# Patient Record
Sex: MALE | Race: WHITE | NOT HISPANIC OR LATINO | Employment: OTHER | URBAN - METROPOLITAN AREA
[De-identification: names, ages, dates, MRNs, and addresses within clinical notes are randomized per-mention and may not be internally consistent; named-entity substitution may affect disease eponyms.]

---

## 2017-09-23 ENCOUNTER — NURSE TRIAGE (OUTPATIENT)
Dept: NURSING | Facility: CLINIC | Age: 32
End: 2017-09-23

## 2017-09-23 NOTE — TELEPHONE ENCOUNTER
"  Reason for Disposition    [1] Caller requesting NON-URGENT health information AND [2] PCP's office is the best resource     \"I am calling because I live in [place], [place], and I see a psychiatrist, on many medications. But lately I don't feel like they are working. I am , 4 kids, but I don't enjoy doing the things I would normally enjoy. I feel depressed, and disassociated from my thoughts and body. I am not suicidal. I was dx as an adult with PTSD, Bipolar, ADHD and anxiety. I also have chronic pain. I feel like I need to see someone soon and I was referred to [facility].\" I advised RD ER,. Also spent a great deal of time on the phone discussing issues and feelings. I also gave numbers for Crisis Center and Behavioral health intake . Call back if needed.    Additional Information    Negative: [1] Caller is not with the adult (patient) AND [2] reporting urgent symptoms    Negative: Lab result questions    Negative: Medication questions    Negative: Caller cannot be reached by phone    Negative: Caller has already spoken to PCP or another triager    Negative: RN needs further essential information from caller in order to complete triage    Negative: Requesting regular office appointment    Protocols used: INFORMATION ONLY CALL-ADULT-    "

## 2017-09-26 ENCOUNTER — HOSPITAL ENCOUNTER (INPATIENT)
Facility: CLINIC | Age: 32
LOS: 2 days | Discharge: HOME | DRG: 885 | End: 2017-09-29
Attending: EMERGENCY MEDICINE | Admitting: PSYCHIATRY & NEUROLOGY
Payer: COMMERCIAL

## 2017-09-26 DIAGNOSIS — F31.64 BIPOLAR DISORDER, CURRENT EPISODE MIXED, SEVERE, WITH PSYCHOTIC FEATURES (H): ICD-10-CM

## 2017-09-26 DIAGNOSIS — F31.81 BIPOLAR 2 DISORDER (H): Primary | ICD-10-CM

## 2017-09-26 LAB
AMPHETAMINES UR QL SCN: NEGATIVE
BARBITURATES UR QL: NEGATIVE
BENZODIAZ UR QL: POSITIVE
CANNABINOIDS UR QL SCN: NEGATIVE
COCAINE UR QL: NEGATIVE
ETHANOL UR QL SCN: NEGATIVE
OPIATES UR QL SCN: NEGATIVE

## 2017-09-26 PROCEDURE — 80307 DRUG TEST PRSMV CHEM ANLYZR: CPT | Performed by: PSYCHIATRY & NEUROLOGY

## 2017-09-26 PROCEDURE — 80320 DRUG SCREEN QUANTALCOHOLS: CPT | Performed by: PSYCHIATRY & NEUROLOGY

## 2017-09-26 PROCEDURE — 99285 EMERGENCY DEPT VISIT HI MDM: CPT | Mod: 25 | Performed by: EMERGENCY MEDICINE

## 2017-09-26 PROCEDURE — 90791 PSYCH DIAGNOSTIC EVALUATION: CPT

## 2017-09-26 PROCEDURE — 99285 EMERGENCY DEPT VISIT HI MDM: CPT | Mod: Z6 | Performed by: EMERGENCY MEDICINE

## 2017-09-26 NOTE — IP AVS SNAPSHOT
MRN:[id number]                      After Visit Summary   9/26/2017    Declan Nation    MRN: [id number]           Thank you!     Thank you for choosing [facility] for your care. Our goal is always to provide you with excellent care.        Patient Information     Date Of Birth          1985        Designated Caregiver       Most Recent Value    Caregiver    Will someone help with your care after discharge? yes    Name of designated caregiver Wife    Phone number of caregiver na    Caregiver address same      About your hospital stay     You were admitted on:  September 27, 2017 You last received care in the:  [facility]    You were discharged on:  September 29, 2017        Reason for your hospital stay       Bipolar 2, obsessions                  Who to Call     For medical emergencies, please call 911.  For non-urgent questions about your medical care, please call your primary care provider or clinic, [phone]          Attending Provider     Provider Specialty    Vivek Cadena MD Emergency Medicine    [facility], Kentrell Beal MD Psychiatry    [facility], Shun Asencio MD Psychiatry       Primary Care Provider Office Phone # Fax #    Aman Staley [phone]      After Care Instructions     Activity       Your activity upon discharge: activity as tolerated            Diet       Follow this diet upon discharge: Orders Placed This Encounter      Regular Diet Adult            Discharge Instructions       1. Please do not harm yourself or others.  2. Please continue to take your medications.  3. Please follow up with your outpatient care team.  4. Please do not take drugs or alcohol as this will worsen your condition.  5. Please do not take more than the prescribed doses of medications as this may make them dangerous.   6. Please follow your safety plan of action.  7. Please call crisis if having trouble.  8. If having thoughts of harming self or others please come in to the  emergency department as soon as possible.                  Further instructions from your care team        Behavioral Discharge Planning and Instructions      Summary:  You were admitted on 9/26/2017  due to PTSD (Post Tramatic Stress Disorder), Bipolar II Disorder  and Suicidal Ideations.  You were treated by Dr. Kentrlel Thayer MD and discharged on 09/29/17 from [facility] to follow up with your outpatient providers.    Principal Diagnosis: Bipolar II Disorder     Health Care Follow-up Appointments:   [facility]   [address]  [place] [address]  Phone: [phone]  Psychiatrist: Dr. Gaetano Kay    [facility]   [address]  [place] [address]   Phone: [phone]   Therapist: Dr. Aman Guevara     If no appointments scheduled, explain: Message were left to schedule. Patient is able to independently schedule his appointments following discharge  Attend all scheduled appointments with your outpatient providers. Call at least 24 hours in advance if you need to reschedule an appointment to ensure continued access to your outpatient providers.   Major Treatments, Procedures and Findings:  You were provided with: a psychiatric assessment, assessed for medical stability, medication evaluation and/or management, group therapy and milieu management    Symptoms to Report: feeling more aggressive, increased confusion, losing more sleep, mood getting worse or thoughts of suicide    Early warning signs can include: increased depression or anxiety sleep disturbances increased thoughts or behaviors of suicide or self-harm  increased unusual thinking, such as paranoia or hearing voices    Safety and Wellness:  Take all medicines as directed.  Make no changes unless your doctor suggests them.      Follow treatment recommendations.  Refrain from alcohol and non-prescribed drugs.  Ask your support system to help you reduce your access to items  "that could harm yourself or others. If there is a concern for safety, call 911.    Resources:   Crisis Intervention: 656.701.3455 or 260-241-5420 (TTY: 197.201.7927).  Call anytime for help.  [place] on [place]ness (www.mn.lay.org): 891.548.2260 or 963-572-0583.  Alcoholics Anonymous (www.alcoholics-anonymous.org): Check your phone book for your local chapter.  Suicide Awareness Voices of Education (SAVE) (www.save.org): 769-228-QAPE (3760)  National Suicide Prevention Line (www.mentalhealthmn.org): 745-126-LVHV (6331)  Mental Health Consumer/Survivor Network of [place] (www.mhcsn.net): 712.680.4828 or 881-071-5601  Mental Health Association of [place] (www.mentalhealth.org): 529.387.9607 or 629-257-1914  Self- Management and Recovery Training., SMART-- Toll free: 248.575.7207  [employer]  Text 4 Life: txt \"LIFE\" to 63165 for immediate support and crisis intervention  Crisis text line: Text \"START\" to 005-643. Free, confidential, 24/7.  Crisis Intervention: 702.201.5684 or 959-708-2445. Call anytime for help.     The treatment team has appreciated the opportunity to work with you.     Please take care and make your recovery a daily recovery.   If you have any questions or concerns our unit number is [phone].  Thank you.        Pending Results     No orders found from 9/24/2017 to 9/27/2017.            Statement of Approval     Ordered          09/29/17 1013  I have reviewed and agree with all the recommendations and orders detailed in this document.  EFFECTIVE NOW     Approved and electronically signed by:  Shun Hernandez MD             Admission Information     Date & Time Provider Department Dept. Phone    9/26/2017 Shun Hernandez MD [facility] [phone]      Your Vitals Were     Blood Pressure Pulse Temperature Respirations Height Weight    119/75 73 97  F (36.1  C) (Oral) 16 1.88 m (6' 2\") 93.1 kg (205 lb 4.8 oz)    Pulse Oximetry BMI (Body Mass Index)                " "97% 26.36 kg/m2          [employer] Information     [employer] lets you send messages to your doctor, view your test results, renew your prescriptions, schedule appointments and more. To sign up, go to [email or web address] . Click on \"Log in\" on the left side of the screen, which will take you to the Welcome page. Then click on \"Sign up Now\" on the right side of the page.     You will be asked to enter the access code listed below, as well as some personal information. Please follow the directions to create your username and password.     Your access code is: [email or web address]  Expires: 2017 12:14 PM     Your access code will  in 90 days. If you need help or a new code, please call your [facility] or [phone].        Care EveryWhere ID     This is your Care EveryWhere ID. This could be used by other organizations to access your [facility] medical records  [id number]        Equal Access to Services     DEQUAN ROMAN : Hadii ana olivero Sogabriel, waaxda luqadaha, qaybta kaalmada adeegyadeirdre, bakari pike . So [facility] [phone].    ATENCIÓN: Si habla español, tiene a rosas disposición servicios gratuitos de asistencia lingüística. Llame al [phone].    We comply with applicable federal civil rights laws and [place] laws. We do not discriminate on the basis of race, color, national origin, age, disability, sex, sexual orientation, or gender identity.               Review of your medicines      START taking        Dose / Directions    lithium 300 MG CR tablet   Commonly known as:  ESKALITH/LITHOBID        Dose:  300 mg   Take 1 tablet (300 mg) by mouth every 12 hours   Quantity:  60 tablet   Refills:  0         CONTINUE these medicines which have NOT CHANGED        Dose / Directions    acetaminophen 500 MG tablet   Commonly known as:  TYLENOL        Dose:  1000 mg   Take 1,000 mg by mouth every 6 hours as needed for pain   Refills:  0       cetirizine 10 MG tablet   Commonly " known as:  zyrTEC        Dose:  10 mg   Take 10 mg by mouth daily   Refills:  0       DIAZEPAM PO   Indication:  Feeling Anxious        Dose:  10 mg   Take 10 mg by mouth 2 times daily as needed for anxiety Patient reported he takes 10 mg in the morning when he wakes up and as needed in the evening   Refills:  0       LATUDA PO        Dose:  80 mg   Take 80 mg by mouth At Bedtime Take at 1030 pm   Refills:  0       methylphenidate 10 MG tablet   Commonly known as:  RITALIN        Dose:  10 mg   Take 10 mg by mouth 2 times daily Patient reports he takes in the morning and at 1730   Refills:  0       nexIUM 20 MG CR capsule   Generic drug:  esomeprazole        Dose:  20 mg   Take 20 mg by mouth daily (with dinner) Take 30-60 minutes before eating.   Refills:  0       propranolol 20 MG tablet   Commonly known as:  INDERAL        Dose:  20 mg   Take 20 mg by mouth 2 times daily as needed (EPS)   Refills:  0       ZYPREXA ZYDIS 5 MG ODT tab   Generic drug:  OLANZapine zydis        Dose:  5-10 mg   Take 5-10 mg by mouth daily as needed (anxiety)   Refills:  0            Where to get your medicines      These medications were sent to [facility] [place], [place] - [address]  [address] [address]     Phone:  [phone]     lithium 300 MG CR tablet                Protect others around you: Learn how to safely use, store and throw away your medicines at www.disposemymeds.org.             Medication List: This is a list of all your medications and when to take them. Check marks below indicate your daily home schedule. Keep this list as a reference.      Medications           Morning Afternoon Evening Bedtime As Needed    acetaminophen 500 MG tablet   Commonly known as:  TYLENOL   Take 1,000 mg by mouth every 6 hours as needed for pain   Last time this was given:  975 mg on 9/28/2017  8:54 PM                                   cetirizine 10 MG tablet   Commonly known as:  zyrTEC    Take 10 mg by mouth daily   Last time this was given:  10 mg on 9/28/2017  8:55 PM                                   DIAZEPAM PO   Take 10 mg by mouth 2 times daily as needed for anxiety Patient reported he takes 10 mg in the morning when he wakes up and as needed in the evening   Last time this was given:  10 mg on 9/29/2017 10:11 AM                                   LATUDA PO   Take 80 mg by mouth At Bedtime Take at 1030 pm   Last time this was given:  80 mg on 9/28/2017  8:54 PM                                   lithium 300 MG CR tablet   Commonly known as:  ESKALITH/LITHOBID   Take 1 tablet (300 mg) by mouth every 12 hours   Last time this was given:  300 mg on 9/29/2017 10:10 AM                                      methylphenidate 10 MG tablet   Commonly known as:  RITALIN   Take 10 mg by mouth 2 times daily Patient reports he takes in the morning and at 1730                                      nexIUM 20 MG CR capsule   Take 20 mg by mouth daily (with dinner) Take 30-60 minutes before eating.   Generic drug:  esomeprazole                                   propranolol 20 MG tablet   Commonly known as:  INDERAL   Take 20 mg by mouth 2 times daily as needed (EPS)   Last time this was given:  20 mg on 9/28/2017  8:54 PM                                   ZYPREXA ZYDIS 5 MG ODT tab   Take 5-10 mg by mouth daily as needed (anxiety)   Last time this was given:  5 mg on 9/28/2017  3:13 PM   Generic drug:  OLANZapine zydis                                             More Information        Patient Education    Lithium Carbonate Oral capsule    Lithium Carbonate Oral tablet    Lithium Carbonate Oral tablet, extended-release    Lithium Citrate Oral solution  Lithium Carbonate Oral tablet  What is this medicine?  LITHIUM (LITH ee um) is used to prevent and treat the manic episodes caused by manic-depressive illness.  This medicine may be used for other purposes; ask your health care provider or pharmacist if you have  questions.  What should I tell my health care provider before I take this medicine?  They need to know if you have any of these conditions:    Brugada Syndrome    dehydration (diarrhea or sweating)    heart or blood vessel disease    kidney disease    low level of salt in the blood, or on a low salt diet    an unusual or allergic reaction to lithium, other medicines, foods, dyes, or preservatives    pregnant or trying to get pregnant    breast-feeding  How should I use this medicine?  Take this medicine by mouth with a glass of water. Follow the directions on the prescription label. Take after a meal or snack to avoid stomach upset. Take your doses at regular intervals. Do not take your medicine more often than directed. The amount of this medicine you take is very important. Taking more than the prescribed dose can cause serious side effects. Do not stop taking except on the advice of your doctor or health care professional.  Talk to your pediatrician regarding the use of this medicine in children. Special care may be needed. While this drug may be prescribed for children as young as 12 years for selected conditions, precautions do apply.  Overdosage: If you think you have taken too much of this medicine contact a poison control center or emergency room at once.  NOTE: This medicine is only for you. Do not share this medicine with others.  What if I miss a dose?  If you miss a dose, take it as soon as you can. If it is almost time for your next dose, take only that dose. Do not take double or extra doses.  What may interact with this medicine?  Do not take this medicine with any of the following medications:    cisapride    dofetilide    dronedarone    pimozide    stimulant medicines used to treat ADHD or narcolepsy    thioridazine    ziprasidone  This medicine may also interact with the following medications:    caffeine    calcium iodide    carbamazepine    certain medicines for depression, anxiety, or psychotic  disturbances    diuretics    medicines for high blood pressure    metronidazole    NSAIDs, medicines for pain and inflammation, like ibuprofen or naproxen    other medicines that prolong the QT interval (cause an abnormal heart rhythm)    phenytoin    potassium iodide, KI    sodium bicarbonate    sodium chloride    urea  This list may not describe all possible interactions. Give your health care provider a list of all the medicines, herbs, non-prescription drugs, or dietary supplements you use. Also tell them if you smoke, drink alcohol, or use illegal drugs. Some items may interact with your medicine.  What should I watch for while using this medicine?  Visit your doctor or health care professional for regular checks on your progress. It can take several weeks of treatment before you start to get better.  The amount of salt (sodium) in your body influences the effects of this medicine, and this medicine can increase salt loss from the body. Eat a normal diet that includes salt. Do not change to salt substitutes. Avoid changes involving diet, or medications that include large amounts of sodium like sodium bicarbonate. Ask your doctor or health care professional for advice if you are not sure.  Drink plenty of fluids while you are taking this medicine. Avoid drinks that contain caffeine, such as coffee, tea and dino. You will need extra fluids if you have diarrhea or sweat a lot. This will help prevent toxic effects from this medicine. Be careful not to get overheated during exercise, saunas, hot baths, and hot weather. Consult your doctor or health care professional if you have a high fever or persistent diarrhea.  You may get drowsy or dizzy. Do not drive, use machinery, or do anything that needs mental alertness until you know how this medicine affects you. Do not stand or sit up quickly, especially if you are an older patient. This reduces the risk of dizzy or fainting spells.  What side effects may I notice  from receiving this medicine?  Side effects that you should report to your doctor or health care professional as soon as possible:    allergic reactions like skin rash, itching or hives, swelling of the face, lips, or tongue    blurred vision    breathing problems    clumsiness or loss of balance    confusion    difficulty speaking or swallowing    dizziness    feeling faint or lightheaded, falls    increased thirst    increased urination    loss of appetite    muscle weakness    nausea, vomiting    pain, coldness, or blue coloration of fingers or toes    sensitivity to cold    seizures    slow, fast, or irregular heartbeat (palpitations)    slurred speech    swelling in the neck    unusually weak or tired  Side effects that usually do not require medical attention (report to your doctor or health care professional if they continue or are bothersome):    acne    diarrhea    mild tremor    stomach pain    weight gain  This list may not describe all possible side effects. Call your doctor for medical advice about side effects. You may report side effects to FDA at 3-791-FDA-1758.  Where should I keep my medicine?  Keep out of the reach of children.  Store at room temperature between 15 and 30 degrees C (59 and 86 degrees F). Throw away any unused medicine after the expiration date.  NOTE:This sheet is a summary. It may not cover all possible information. If you have questions about this medicine, talk to your doctor, pharmacist, or health care provider. Copyright  2016 Gold Standard

## 2017-09-26 NOTE — IP AVS SNAPSHOT
[address] [address]    Phone:  [phone]                                       After Visit Summary   9/26/2017    Declan Nation    MRN: [id number]           After Visit Summary Signature Page     I have received my discharge instructions, and my questions have been answered. I have discussed any challenges I see with this plan with the nurse or doctor.    ..........................................................................................................................................  Patient/Patient Representative Signature      ..........................................................................................................................................  Patient Representative Print Name and Relationship to Patient    ..................................................               ................................................  Date                                            Time    ..........................................................................................................................................  Reviewed by Signature/Title    ...................................................              ..............................................  Date                                                            Time

## 2017-09-27 PROBLEM — F31.81 BIPOLAR 2 DISORDER (H): Status: ACTIVE | Noted: 2017-09-27

## 2017-09-27 PROCEDURE — 25000132 ZZH RX MED GY IP 250 OP 250 PS 637: Performed by: PSYCHIATRY & NEUROLOGY

## 2017-09-27 PROCEDURE — 25000125 ZZHC RX 250: Performed by: PSYCHIATRY & NEUROLOGY

## 2017-09-27 PROCEDURE — 12400007 ZZH R&B MH INTERMEDIATE UMMC

## 2017-09-27 PROCEDURE — 25000132 ZZH RX MED GY IP 250 OP 250 PS 637: Performed by: EMERGENCY MEDICINE

## 2017-09-27 RX ORDER — PANTOPRAZOLE SODIUM 20 MG/1
20 TABLET, DELAYED RELEASE ORAL
Status: DISCONTINUED | OUTPATIENT
Start: 2017-09-27 | End: 2017-09-29 | Stop reason: HOSPADM

## 2017-09-27 RX ORDER — BISACODYL 10 MG
10 SUPPOSITORY, RECTAL RECTAL DAILY PRN
Status: DISCONTINUED | OUTPATIENT
Start: 2017-09-27 | End: 2017-09-29 | Stop reason: HOSPADM

## 2017-09-27 RX ORDER — CLONAZEPAM 0.5 MG/1
0.5 TABLET ORAL 2 TIMES DAILY PRN
Status: DISCONTINUED | OUTPATIENT
Start: 2017-09-27 | End: 2017-09-28

## 2017-09-27 RX ORDER — ACETAMINOPHEN 325 MG/1
975 TABLET ORAL EVERY 4 HOURS PRN
Status: DISCONTINUED | OUTPATIENT
Start: 2017-09-27 | End: 2017-09-29 | Stop reason: HOSPADM

## 2017-09-27 RX ORDER — ACETAMINOPHEN 500 MG
1000 TABLET ORAL EVERY 6 HOURS PRN
COMMUNITY

## 2017-09-27 RX ORDER — ACETAMINOPHEN 325 MG/1
650 TABLET ORAL EVERY 4 HOURS PRN
Status: DISCONTINUED | OUTPATIENT
Start: 2017-09-27 | End: 2017-09-29 | Stop reason: HOSPADM

## 2017-09-27 RX ORDER — ALUMINA, MAGNESIA, AND SIMETHICONE 2400; 2400; 240 MG/30ML; MG/30ML; MG/30ML
30 SUSPENSION ORAL EVERY 4 HOURS PRN
Status: DISCONTINUED | OUTPATIENT
Start: 2017-09-27 | End: 2017-09-29 | Stop reason: HOSPADM

## 2017-09-27 RX ORDER — OLANZAPINE 5 MG/1
5-10 TABLET, ORALLY DISINTEGRATING ORAL DAILY PRN
Status: DISCONTINUED | OUTPATIENT
Start: 2017-09-27 | End: 2017-09-29 | Stop reason: HOSPADM

## 2017-09-27 RX ORDER — PROPRANOLOL HYDROCHLORIDE 20 MG/1
20 TABLET ORAL 2 TIMES DAILY PRN
Status: DISCONTINUED | OUTPATIENT
Start: 2017-09-27 | End: 2017-09-29 | Stop reason: HOSPADM

## 2017-09-27 RX ORDER — CITALOPRAM HYDROBROMIDE 10 MG/1
10 TABLET ORAL DAILY
Status: DISCONTINUED | OUTPATIENT
Start: 2017-09-27 | End: 2017-09-28

## 2017-09-27 RX ORDER — PROPRANOLOL HYDROCHLORIDE 20 MG/1
20 TABLET ORAL 2 TIMES DAILY PRN
COMMUNITY

## 2017-09-27 RX ORDER — LURASIDONE HYDROCHLORIDE 80 MG/1
80 TABLET, FILM COATED ORAL AT BEDTIME
Status: DISCONTINUED | OUTPATIENT
Start: 2017-09-27 | End: 2017-09-29 | Stop reason: HOSPADM

## 2017-09-27 RX ORDER — ONDANSETRON 4 MG/1
4 TABLET, ORALLY DISINTEGRATING ORAL EVERY 6 HOURS PRN
Status: DISCONTINUED | OUTPATIENT
Start: 2017-09-27 | End: 2017-09-29 | Stop reason: HOSPADM

## 2017-09-27 RX ORDER — TRAZODONE HYDROCHLORIDE 50 MG/1
50 TABLET, FILM COATED ORAL
Status: DISCONTINUED | OUTPATIENT
Start: 2017-09-27 | End: 2017-09-29 | Stop reason: HOSPADM

## 2017-09-27 RX ORDER — HYDROXYZINE HYDROCHLORIDE 25 MG/1
25-50 TABLET, FILM COATED ORAL EVERY 4 HOURS PRN
Status: DISCONTINUED | OUTPATIENT
Start: 2017-09-27 | End: 2017-09-29 | Stop reason: HOSPADM

## 2017-09-27 RX ORDER — OLANZAPINE 5 MG/1
5-10 TABLET, ORALLY DISINTEGRATING ORAL DAILY PRN
COMMUNITY

## 2017-09-27 RX ORDER — CETIRIZINE HYDROCHLORIDE 10 MG/1
10 TABLET ORAL DAILY
COMMUNITY

## 2017-09-27 RX ORDER — METHYLPHENIDATE HYDROCHLORIDE 10 MG/1
10 TABLET ORAL 2 TIMES DAILY
COMMUNITY

## 2017-09-27 RX ORDER — CETIRIZINE HYDROCHLORIDE 5 MG/1
10 TABLET ORAL EVERY EVENING
Status: DISCONTINUED | OUTPATIENT
Start: 2017-09-27 | End: 2017-09-29 | Stop reason: HOSPADM

## 2017-09-27 RX ADMIN — ONDANSETRON 4 MG: 4 TABLET, ORALLY DISINTEGRATING ORAL at 22:08

## 2017-09-27 RX ADMIN — ACETAMINOPHEN 975 MG: 325 TABLET, FILM COATED ORAL at 04:16

## 2017-09-27 RX ADMIN — LURASIDONE HYDROCHLORIDE 80 MG: 80 TABLET, FILM COATED ORAL at 20:37

## 2017-09-27 RX ADMIN — CETIRIZINE HYDROCHLORIDE 10 MG: 5 TABLET, FILM COATED ORAL at 20:35

## 2017-09-27 RX ADMIN — ACETAMINOPHEN 975 MG: 325 TABLET, FILM COATED ORAL at 13:19

## 2017-09-27 RX ADMIN — OLANZAPINE 10 MG: 5 TABLET, ORALLY DISINTEGRATING ORAL at 20:35

## 2017-09-27 RX ADMIN — PANTOPRAZOLE SODIUM 20 MG: 20 TABLET, DELAYED RELEASE ORAL at 20:35

## 2017-09-27 RX ADMIN — PROPRANOLOL HYDROCHLORIDE 20 MG: 20 TABLET ORAL at 20:35

## 2017-09-27 ASSESSMENT — ENCOUNTER SYMPTOMS
DIFFICULTY URINATING: 0
EYE REDNESS: 0
CONFUSION: 0
NECK STIFFNESS: 0
FEVER: 0
ARTHRALGIAS: 0
COLOR CHANGE: 0
HEADACHES: 0
DECREASED CONCENTRATION: 1
ABDOMINAL PAIN: 0
NERVOUS/ANXIOUS: 1
DYSPHORIC MOOD: 1
SHORTNESS OF BREATH: 0

## 2017-09-27 ASSESSMENT — ACTIVITIES OF DAILY LIVING (ADL)
DRESS: INDEPENDENT
ORAL_HYGIENE: INDEPENDENT
GROOMING: INDEPENDENT
LAUNDRY: UNABLE TO COMPLETE

## 2017-09-27 NOTE — PHARMACY-ADMISSION MEDICATION HISTORY
Admission medication history interview status for the 9/26/2017 admission is complete. See Epic admission navigator for allergy information, pharmacy, prior to admission medications and immunization status.     Medication history interview sources:  Patient, Prescription bottles from [facility]    Changes made to PTA medication list (reason)  Added: propranolol, Zyrtec  Deleted: Celexa, clonazepam, DHEA supplement, omeprazole, propafenone  Changed: Acetaminophen (clarified directions), Nexium (clarified dose), methylphenidate (clarified directions), Zyprexa (changed to ODT)    Additional medication history information (including reliability of information, actions taken by pharmacist): The patient was a reliable historian and able to discuss medications without difficulty. This writer viewed his prescription medications then returned them to his belongings.    Diazepam 10 mg last filled on 09/07/17 for quantity 60  Methylphenidate last filled on 09/11/17 for quantity 60      Prior to Admission medications    Medication Sig Last Dose Taking? Auth Provider   DIAZEPAM PO Take 10 mg by mouth 2 times daily as needed for anxiety Patient reported he takes 10 mg in the morning when he wakes up and as needed in the evening 9/27/2017 at AM Yes Reported, Patient   Lurasidone HCl (LATUDA PO) Take 80 mg by mouth At Bedtime Take at 1030 pm 9/26/2017 at PM Yes Reported, Patient   cetirizine (ZYRTEC) 10 MG tablet Take 10 mg by mouth daily 9/25/2017 at Unknown time Yes Reported, Patient   acetaminophen (TYLENOL) 500 MG tablet Take 1,000 mg by mouth every 6 hours as needed for pain  Yes Unknown, Entered By History   esomeprazole (NEXIUM) 20 MG CR capsule Take 20 mg by mouth daily (with dinner) Take 30-60 minutes before eating. 9/26/2017 at Unknown time Yes Unknown, Entered By History   methylphenidate (RITALIN) 10 MG tablet Take 10 mg by mouth 2 times daily Patient reports he takes in the morning and at  1730 9/27/2017 at AM Yes Unknown, Entered By History   OLANZapine zydis (ZYPREXA ZYDIS) 5 MG ODT tab Take 5-10 mg by mouth daily as needed (anxiety)  Yes Unknown, Entered By History   propranolol (INDERAL) 20 MG tablet Take 20 mg by mouth 2 times daily as needed (EPS)  Yes Unknown, Entered By History         Medication history completed by: Maryse Calhoun, PharmD, BCPP

## 2017-09-27 NOTE — ED PROVIDER NOTES
History     Chief Complaint   Patient presents with     Psychiatric Evaluation     HPI  Declan Nation is a 32 year old male who was referred to the [facility] emergency department to be seen by [facility].  The patient was was referred by his counselor and psychiatrist.  The patient reports he has a history of bipolar disorder.  He's had increasing restlessness and uneasiness.  The patient underwent an increase in his Latuda dose 4-6 weeks ago without significant improvement.  Over the past 2 weeks, he states he is becoming increasingly uncomfortable with his thoughts.  Patient denies suicide ideations but did state he had thoughts about cutting himself in the arm 4-5 days ago.  He states that he did not act on this and does not know why he would have such a thought.  The patient states that he also had thoughts several days ago that his 12-year-old neighbor who babysits his kids was going to stab his kids and then slit her own throat.  The patient denies concern that he is going to harm himself or others.  He denies hallucinations.  He denies recent illness.     I have reviewed the Medications, Allergies, Past Medical and Surgical History, and Social History in the Epic system.    Review of Systems   Constitutional: Negative for fever.   HENT: Negative for congestion.    Eyes: Negative for redness.   Respiratory: Negative for shortness of breath.    Cardiovascular: Negative for chest pain.   Gastrointestinal: Negative for abdominal pain.   Genitourinary: Negative for difficulty urinating.   Musculoskeletal: Negative for arthralgias and neck stiffness.   Skin: Negative for color change.   Neurological: Negative for headaches.   Psychiatric/Behavioral: Positive for decreased concentration and dysphoric mood. Negative for confusion. The patient is nervous/anxious.    All other systems reviewed and are negative.      Physical Exam   BP: 125/83  Pulse: 66  Temp: 98.5  F (36.9  C)  Resp: 16  Weight: 94.8 kg (209 lb)  SpO2:  99 %  Physical Exam   Constitutional: He appears well-developed and well-nourished. No distress.   HENT:   Head: Normocephalic and atraumatic.   Eyes: Pupils are equal, round, and reactive to light. No scleral icterus.   Neck: Normal range of motion.   Cardiovascular: Normal rate, regular rhythm, normal heart sounds and intact distal pulses.    Pulmonary/Chest: Effort normal and breath sounds normal. No respiratory distress.   Abdominal: Soft. Bowel sounds are normal. There is no tenderness.   Musculoskeletal: Normal range of motion. He exhibits no edema or tenderness.   Neurological: He is alert. He has normal strength. Coordination normal.   Skin: Skin is warm and dry. No rash noted. He is not diaphoretic.   Psychiatric: His mood appears anxious. He expresses suicidal ideation.   Nursing note and vitals reviewed.      ED Course     ED Course     Procedures            Critical Care time:    Results for orders placed or performed during the hospital encounter of 09/26/17 (from the past 24 hour(s))   Drug abuse screen 6 urine (tox)   Result Value Ref Range    Amphetamine Qual Urine Negative NEG^Negative    Barbiturates Qual Urine Negative NEG^Negative    Benzodiazepine Qual Urine Positive (A) NEG^Negative    Cannabinoids Qual Urine Negative NEG^Negative    Cocaine Qual Urine Negative NEG^Negative    Ethanol Qual Urine Negative NEG^Negative    Opiates Qualitative Urine Negative NEG^Negative      Seen by [facility] .            Assessments & Plan (with Medical Decision Making)   32 year old male with history of bipolar disorder referred to the emergency department by his psychiatrist and therapist.  The patient has had increase in the intrusive thoughts including those of self-harm and those that his 12-year-old neighbor stabs his daughters and then splits her own throat.  The patient does not appear safe for outpatient management.  I am concerned for his safety as well as the safety of his family and neighbor  girl.    I have reviewed the nursing notes.    I have reviewed the findings, diagnosis, plan and need for follow up with the patient.    New Prescriptions    No medications on file       Final diagnoses:   Bipolar disorder, current episode mixed, severe, with psychotic features (H)       9/26/2017   [facility], [place], EMERGENCY DEPARTMENT     Vivek Cadena MD  09/27/17 0042

## 2017-09-27 NOTE — ED NOTES
Patient referred to the [facility] emergency department to be seen by [facility].  The patient was was referred by his counselor and psychiatrist.  The patient reports he has a history of bipolar disorder.  He's had increasing restlessness and uneasiness.  The patient underwent an increase in his Latuda dose 4-6 weeks ago without significant improvement.  Over the past 2 weeks, he states he is becoming increasingly uncomfortable with his thoughts.  Patient denies suicide ideations but did state he had thoughts about cutting himself in the arm 4-5 days ago.  He states that he did not act on this and does not know why he would have such a thought.  The patient states that he also had thoughts several days ago that his 12-year-old neighbor who babysits his kids was going to stab his kids and then slit her own throat.  The patient denies concern that he is going to harm himself or others.  He denies hallucinations.  He denies recent illness.  The patient has normal vital signs and a normal physical exam.  He appears medically stable for [facility] assessment     Vivek Cadena MD  09/26/17 2043

## 2017-09-27 NOTE — PROGRESS NOTES
09/27/17 9835   Patient Belongings   Did you bring any home meds/supplements to the hospital?  Yes   Disposition of meds  Sent to security/pharmacy per site process   Belongings Search Yes   Clothing Search Yes   Second Staff Abel        Pt.'s belongings in locker: 1 cell phone, 1 cell phone , 1 wallet, 1 belt, 1 sweatshirt.  Brought in for patient 9-28-17 and placed in locker: 6 pair of underwear, 4 pair of socks, 6 white teresa shirts, three colored teresa shirts (grey, blue, red), Packers sweatshirt, deodorant, clog slippers      Sent to Security: 2 State IDs, 1 [employer] credit car, 1 [employer] credit card, 1 Paypal credit card, 1 [employer] debit card, 1 Master card/Google Wallet, 1 ring.                    Admission:  I am responsible for any personal items that are not sent to the safe or pharmacy.  [facility] is not responsible for loss, theft or damage of any property in my possession.    Signature:  _________________________________ Date: _______  Time: _____                                              Staff Signature:  ____________________________ Date: ________  Time: _____      2nd Staff person, if patient is unable/unwilling to sign:    Signature: ________________________________ Date: ________  Time: _____     Discharge:  [facility] has returned all of my personal belongings:    Signature: _________________________________ Date: ________  Time: _____                                          Staff Signature:  ____________________________ Date: ________  Time: _____

## 2017-09-28 LAB
ALBUMIN SERPL-MCNC: 4.1 G/DL (ref 3.4–5)
ALP SERPL-CCNC: 84 U/L (ref 40–150)
ALT SERPL W P-5'-P-CCNC: 16 U/L (ref 0–70)
ANION GAP SERPL CALCULATED.3IONS-SCNC: 11 MMOL/L (ref 3–14)
AST SERPL W P-5'-P-CCNC: <3 U/L (ref 0–45)
BASOPHILS # BLD AUTO: 0 10E9/L (ref 0–0.2)
BASOPHILS NFR BLD AUTO: 0.4 %
BILIRUB SERPL-MCNC: 1 MG/DL (ref 0.2–1.3)
BUN SERPL-MCNC: 16 MG/DL (ref 7–30)
CALCIUM SERPL-MCNC: 9 MG/DL (ref 8.5–10.1)
CHLORIDE SERPL-SCNC: 107 MMOL/L (ref 94–109)
CHOLEST SERPL-MCNC: 220 MG/DL
CO2 SERPL-SCNC: 25 MMOL/L (ref 20–32)
CREAT SERPL-MCNC: 1.13 MG/DL (ref 0.66–1.25)
DEPRECATED CALCIDIOL+CALCIFEROL SERPL-MC: 17 UG/L (ref 20–75)
DIFFERENTIAL METHOD BLD: NORMAL
EOSINOPHIL # BLD AUTO: 0.1 10E9/L (ref 0–0.7)
EOSINOPHIL NFR BLD AUTO: 1.9 %
ERYTHROCYTE [DISTWIDTH] IN BLOOD BY AUTOMATED COUNT: 12.4 % (ref 10–15)
GFR SERPL CREATININE-BSD FRML MDRD: 75 ML/MIN/1.7M2
GLUCOSE SERPL-MCNC: 96 MG/DL (ref 70–99)
HCT VFR BLD AUTO: 45.3 % (ref 40–53)
HDLC SERPL-MCNC: 39 MG/DL
HGB BLD-MCNC: 15.4 G/DL (ref 13.3–17.7)
IMM GRANULOCYTES # BLD: 0 10E9/L (ref 0–0.4)
IMM GRANULOCYTES NFR BLD: 0.2 %
LDLC SERPL CALC-MCNC: 146 MG/DL
LYMPHOCYTES # BLD AUTO: 2 10E9/L (ref 0.8–5.3)
LYMPHOCYTES NFR BLD AUTO: 37.9 %
MCH RBC QN AUTO: 29.4 PG (ref 26.5–33)
MCHC RBC AUTO-ENTMCNC: 34 G/DL (ref 31.5–36.5)
MCV RBC AUTO: 87 FL (ref 78–100)
MONOCYTES # BLD AUTO: 0.3 10E9/L (ref 0–1.3)
MONOCYTES NFR BLD AUTO: 6 %
NEUTROPHILS # BLD AUTO: 2.9 10E9/L (ref 1.6–8.3)
NEUTROPHILS NFR BLD AUTO: 53.6 %
NONHDLC SERPL-MCNC: 181 MG/DL
NRBC # BLD AUTO: 0 10*3/UL
NRBC BLD AUTO-RTO: 0 /100
PLATELET # BLD AUTO: 209 10E9/L (ref 150–450)
POTASSIUM SERPL-SCNC: 4 MMOL/L (ref 3.4–5.3)
PROT SERPL-MCNC: 7.2 G/DL (ref 6.8–8.8)
RBC # BLD AUTO: 5.24 10E12/L (ref 4.4–5.9)
SODIUM SERPL-SCNC: 143 MMOL/L (ref 133–144)
TRIGL SERPL-MCNC: 175 MG/DL
TSH SERPL DL<=0.005 MIU/L-ACNC: 1.38 MU/L (ref 0.4–4)
WBC # BLD AUTO: 5.4 10E9/L (ref 4–11)

## 2017-09-28 PROCEDURE — 12400007 ZZH R&B MH INTERMEDIATE UMMC

## 2017-09-28 PROCEDURE — 82306 VITAMIN D 25 HYDROXY: CPT | Performed by: PSYCHIATRY & NEUROLOGY

## 2017-09-28 PROCEDURE — 99222 1ST HOSP IP/OBS MODERATE 55: CPT | Mod: AI | Performed by: PSYCHIATRY & NEUROLOGY

## 2017-09-28 PROCEDURE — 80061 LIPID PANEL: CPT | Performed by: PSYCHIATRY & NEUROLOGY

## 2017-09-28 PROCEDURE — 90686 IIV4 VACC NO PRSV 0.5 ML IM: CPT | Performed by: PSYCHIATRY & NEUROLOGY

## 2017-09-28 PROCEDURE — 25000132 ZZH RX MED GY IP 250 OP 250 PS 637: Performed by: EMERGENCY MEDICINE

## 2017-09-28 PROCEDURE — 85025 COMPLETE CBC W/AUTO DIFF WBC: CPT | Performed by: PSYCHIATRY & NEUROLOGY

## 2017-09-28 PROCEDURE — 25000132 ZZH RX MED GY IP 250 OP 250 PS 637: Performed by: PSYCHIATRY & NEUROLOGY

## 2017-09-28 PROCEDURE — 97150 GROUP THERAPEUTIC PROCEDURES: CPT | Mod: GO

## 2017-09-28 PROCEDURE — 36415 COLL VENOUS BLD VENIPUNCTURE: CPT | Performed by: PSYCHIATRY & NEUROLOGY

## 2017-09-28 PROCEDURE — 84443 ASSAY THYROID STIM HORMONE: CPT | Performed by: PSYCHIATRY & NEUROLOGY

## 2017-09-28 PROCEDURE — 80053 COMPREHEN METABOLIC PANEL: CPT | Performed by: PSYCHIATRY & NEUROLOGY

## 2017-09-28 PROCEDURE — 25000128 H RX IP 250 OP 636: Performed by: PSYCHIATRY & NEUROLOGY

## 2017-09-28 PROCEDURE — 90853 GROUP PSYCHOTHERAPY: CPT

## 2017-09-28 RX ORDER — DIAZEPAM 5 MG
10 TABLET ORAL 2 TIMES DAILY PRN
Status: DISCONTINUED | OUTPATIENT
Start: 2017-09-28 | End: 2017-09-29 | Stop reason: HOSPADM

## 2017-09-28 RX ORDER — LITHIUM CARBONATE 300 MG/1
300 TABLET, FILM COATED, EXTENDED RELEASE ORAL EVERY 12 HOURS SCHEDULED
Status: DISCONTINUED | OUTPATIENT
Start: 2017-09-28 | End: 2017-09-29 | Stop reason: HOSPADM

## 2017-09-28 RX ADMIN — PROPRANOLOL HYDROCHLORIDE 20 MG: 20 TABLET ORAL at 20:54

## 2017-09-28 RX ADMIN — OLANZAPINE 5 MG: 5 TABLET, ORALLY DISINTEGRATING ORAL at 15:13

## 2017-09-28 RX ADMIN — LURASIDONE HYDROCHLORIDE 80 MG: 80 TABLET, FILM COATED ORAL at 20:54

## 2017-09-28 RX ADMIN — LITHIUM CARBONATE 300 MG: 300 TABLET, EXTENDED RELEASE ORAL at 09:29

## 2017-09-28 RX ADMIN — DIAZEPAM 10 MG: 5 TABLET ORAL at 09:38

## 2017-09-28 RX ADMIN — OMEPRAZOLE 20 MG: 20 CAPSULE, DELAYED RELEASE ORAL at 18:18

## 2017-09-28 RX ADMIN — ACETAMINOPHEN 975 MG: 325 TABLET, FILM COATED ORAL at 20:54

## 2017-09-28 RX ADMIN — PANTOPRAZOLE SODIUM 20 MG: 20 TABLET, DELAYED RELEASE ORAL at 18:18

## 2017-09-28 RX ADMIN — LITHIUM CARBONATE 300 MG: 300 TABLET, EXTENDED RELEASE ORAL at 20:54

## 2017-09-28 RX ADMIN — INFLUENZA A VIRUS A/MICHIGAN/45/2015 X-275 (H1N1) ANTIGEN (FORMALDEHYDE INACTIVATED), INFLUENZA A VIRUS A/HONG KONG/4801/2014 X-263B (H3N2) ANTIGEN (FORMALDEHYDE INACTIVATED), INFLUENZA B VIRUS B/PHUKET/3073/2013 ANTIGEN (FORMALDEHYDE INACTIVATED), AND INFLUENZA B VIRUS B/BRISBANE/60/2008 ANTIGEN (FORMALDEHYDE INACTIVATED) 0.5 ML: 15; 15; 15; 15 INJECTION, SUSPENSION INTRAMUSCULAR at 10:22

## 2017-09-28 RX ADMIN — CETIRIZINE HYDROCHLORIDE 10 MG: 5 TABLET, FILM COATED ORAL at 20:55

## 2017-09-28 ASSESSMENT — ACTIVITIES OF DAILY LIVING (ADL)
DRESS: INDEPENDENT;SCRUBS (BEHAVIORAL HEALTH)
ORAL_HYGIENE: INDEPENDENT
GROOMING: INDEPENDENT
LAUNDRY: UNABLE TO COMPLETE
ORAL_HYGIENE: INDEPENDENT
DRESS: INDEPENDENT
LAUNDRY: WITH SUPERVISION
HYGIENE/GROOMING: INDEPENDENT

## 2017-09-28 NOTE — PLAN OF CARE
Problem: Patient Care Overview  Goal: Plan of Care/Patient Progress Review  Personal Plan of Care     Reasons you are in the Hospital  1. Symptoms of Bipolar and PTSD have worsened   2. Weird thoughts, unsure where they come from   3.  4.     Goals for Discharge   1. Get well   2. Figure out medication changes/what needs to happen   3. Need more help

## 2017-09-28 NOTE — PROGRESS NOTES
Initial Psychosocial Assessment    I have reviewed the chart, met with the patient, and developed Care Plan.  Information for assessment was obtained from:     Chart review and interview with Pt.     Presenting Problem:  Writer met with Pt who was cooperative during assessment interview. Writer discussed day treatment and or DBT groups with Pt who stated he was not interested in having a higher level of care on an outpatient basis. Pt indicated he is more concerned about medications getting sorted out than discussing adult mental health groups. Writer expressed understanding of this. Pt appears apprehensive and indecisive, he reported he would like to be discharged, and in the same sentence talked about medication adjustments/needing time for changes. Pt demonstrated having difficulty maintaining eye contact with Writer. Pt signed ROIs for outpatient treatment providers listed below.     Per chart:   Declan Nation is a 32 year old male admitted to station 10 due to not being stable on his current medication regimen. He is diagnosed with Bipolar type2, PTSD, INOCENTE, and ADHD. In the last week he has had intrusive thoughts about suicide and thought that his 12 year old neighbor who watches his daughters was going to stab them and then slit her own throat and kill herself as well. At one point the patient was sitting on the couch, playing on his phone and he suddenly had thoughts of going into the kitchen and cutting up his harms. He realized that these were inappropriate thoughts and did not act them, but would like to not have them at all. He is  and has 4 daughters, 6, 4, 2 and almost 1 year old. They live in a home and are both unemployed. His parents have been successful business owners and financially support Declan and his family. He is calm and cooperative. He states he doesn't have any SI or SIB thoughts. He says he has SI thoughts that never have a plan, but that he sometimes wonders if someone were to get  "shot he would be okay with it. He has been started on latuda and feels that it can keep him somewhat stable, but not as stable and he would like to be. Patient says he has tried just about every anti-seizure med out there and it has not worked. He has not tried lithium and would like to keep it for a last resort. Patient reports that his bipolar thoughts come faster than most and he becomes very manic even while he is in a \"low phase\" and that it creates an \"energetic, horrible depression.\" He reports that he is in chronic pain and that he has had surgery on just about every major joint in his body including his shoulders, wrists, and legs. No behavioral concerns at this time. Patient was calm and cooperative throughout the entire admission. He did appear very anxious and became tearful at points.    History of Mental Health and Chemical Dependency:  This is Pt's first mental health hospitalization. Pt does have a psychiatrist and therapist, has never done any day treatment/partial hospitalization program. Pt has never attempted suicide.   Pt denied any drug or alcohol use.     Family Description (Constellation, Family Psychiatric History):  Pt was raised in [place] raised by both parents. Pt has one younger brother. Pt is , they have four daughters together.   Pt reported his paternal uncle had schizophrenia.     Significant Life Events (Illness, Abuse, Trauma, Death):  Pt stated, \"nothing that needs to be addressed here.\"      Living Situation:  Pt lives in home with family.     Educational Background:  Pt graduated high school, attended some college.     Occupational History:  Unemployed, previously self-employed      Financial Status:  Assistance from family members, in the beginning stages of applying for social security disability     Legal Issues:  None     Ethnic/Cultural Issues:  None     Spiritual Orientation:  Rastafari      Service History:  None     Social Functioning (organization, " interests):  Interests: computers, video games, sports, typical trent stuff, family, sci-fiction/fantasy nerd   Supports: wife and children (in a way), mother and father, in-laws     Current Treatment Providers are:  [facility]   [address]  [place] [address]  Phone: [phone]  Psychiatrist: Dr. Gaetano Kay    [facility]   [address]  [place] [address]   Phone: [phone]   Therapist: Dr. Aamn Guevara     Social Service Assessment/Plan:  Patient has been admitted for psychiatric stabilization due to thoughts of self harm. Patient will have psychiatric assessment and medication management by the psychiatrist. Medications will be reviewed and adjusted per MD as indicated. The treatment team will continue to assess and stabilize the patient's mental health symptoms with the use of medications and therapeutic programming. Hospital staff will provide a safe environment and a therapeutic milieu. Staff will continue to assess patient as needed. Patient will participate in unit groups and activities. Patient will receive individual and group support on the unit.  CTC will do individual inpatient treatment planning and after care planning. CTC will discuss options for increasing community supports with the patient. CTC will coordinate with outpatient providers and will place referrals to ensure appropriate follow up care is in place.

## 2017-09-28 NOTE — PLAN OF CARE
Problem: Patient Care Overview  Goal: Team Discussion  Team Plan:   BEHAVIORAL TEAM DISCUSSION     Participants: Manohar Simons OT-R, Yesenia Varela RN, and Dr. Kentrell Thayer.   Progress: Initial team discussion.  Continued Stay Criteria/Rationale: Pt was voluntarily admitted due to thoughts to self harm.   Medical/Physical: See medical consult.   Precautions:   Behavioral Orders   Procedures     Code 1 - Restrict to Unit     Routine Programming       As clinically indicated     Status 15       Every 15 minutes.     Plan: The plan is to assess the patient for mental health and medication needs.  The patient will be prescribed medications to treat the identified symptoms.  Upon discharge the patient will be referred to services as appropriate based on the assessment.  Rationale for change in precautions or plan: Initial plan, no changes.

## 2017-09-28 NOTE — H&P
"DATE OF ADMISSION:  09/26/2017.       PATIENT ARRIVED ON UNIT:  09/27/2017.       PATIENT SEEN ON THE UNIT:  09/28/2017.       CHIEF COMPLAINT:  \"I haven't found the best answer to that.\"      HISTORY OF PRESENT ILLNESS:  Declan Nation is a 32-year-old white male with a history of bipolar disorder and anxiety disorder who was admitted after having self-harm thoughts and thoughts that his 12-year-old neighbor who babysits his daughters was planning to kill them.  He says that his symptoms have not been kept in check in terms of his bipolar disorder recently.  He says he has not actually had suicidal thoughts, but he has had thoughts of self-harm that have been stronger than they have in the past.  Denies homicidal ideation, denies auditory or visual hallucinations.  Discussed the patient's history, has never really had a full manic episode but has had a lot of hypomanic symptoms.  The patient had an analogy of how a student who is getting C-'s or D's is stable but not doing well and said that that is how he has been.  They discussed this with a psychiatrist.  They increased his Latuda 1-1/2 months ago.  Feels a little better, but then overall not that much better.  Does take his Valium in the morning and sometimes later in the day.  Says he has been taking the Ritalin 10 mg twice a day but is not sure if it was helpful.  He says he has been on a lot of different anticonvulsant medications, has never tried lithium, but he is open to this idea.  Denies problems with sleep or appetite.  Mood has been low.      PAST PSYCHIATRIC HISTORY:  The patient does have a psychiatrist and therapist that he sees twice weekly, has never done any structured therapy or day program, never been hospitalized, never attempted suicide.      CURRENT PSYCHIATRIC MEDICATIONS:   1.  Latuda 80 mg q. day.   2.  Valium 10 mg b.i.d. p.r.n.   3.  Ritalin 10 mg b.i.d.   4.  Inderal 20 mg b.i.d. p.r.n.   5.  Zyprexa Zydis 5-10 mg q. day p.r.n.    "   ALLERGIES:  Cortisone.      PAST MEDICAL HISTORY:  GERD and patient says a lot of physical pain issues.      REVIEW OF SYSTEMS:  A 10-point systems reviewed and all were negative except those mentioned in the HPI.      FAMILY PSYCHIATRIC HISTORY:  The patient believes that his paternal uncle had schizophrenia.      SUBSTANCE HISTORY:  The patient denies any alcohol or drug use, is a nonsmoker.      SOCIAL HISTORY:  The patient is , has 4 daughters, currently unemployed.  Parents are emotionally and financially supportive.      PHYSICAL EXAMINATION:  Please see the physical exam note from Dr. Cadena in the ER on 09/27/2017 which I have reviewed.   CURRENT VITAL SIGNS:  Blood pressure 137/86, pulse 64, respirations 16, temperature 97.4.      MENTAL STATUS EXAMINATION:  SEB:  The patient is a 32-year-old white male, cooperative, pleasant, good eye contact.  Speech:  Regular rate, rhythm, volume and tone.  Mood is depressed.  Affect is mood congruent.  Thought process is fairly goal-directed.  Thought content:  Has had recent self-injurious thoughts but no suicidal ideation, denies homicidal ideation, denies any psychosis.  No loosening of associations noted.  Sensorium is clear.  Cognition:  Oriented x3.  Recent and remote memory appear to be grossly intact.  Attention and concentration:  No deficits noted.  Language:  No deficits noted.  Fund of knowledge appropriate.  Muscle strength and tone, no deficits noted.  Gait and station, no deficits noted.  Insight is partial.  Judgment is fair.      DIAGNOSES:   Axis I:     1.  Bipolar disorder type 2.    2.  Post-traumatic stress disorder versus generalized anxiety disorder.   3.  History of attention deficit hyperactivity disorder.   Axis II:  Cluster B traits.   Axis III:  See past medical history.      PLAN:   1.  The patient was voluntarily admitted to station 10.  He was encouraged to engage in groups and with staff on unit.   2.  Did clean up some of the  patient's medications.  He was started on Celexa and says he does not normally take this.  Was also on Klonopin p.r.n. instead of Valium so these were changed.   3.  Will hold the Ritalin at this time and the patient can discuss this further with Dr. Hernandez when he returns tomorrow.   4.  Did discuss lithium at length with the patient.  Did offer him more time to consider it.  He said that he and his psychiatrist have kind of kept this as a last ditch option.  He was open to trying it so we provided some more information on this and will start lithium 300 mg b.i.d.  Risks, benefits and alternatives to this medication were discussed with the patient.   5.  CTC to meet with the patient to discuss a structured therapy such as day programming versus CBT or DBT.   6.  The patient will be followed by Dr. Hernandez starting tomorrow and he voiced understanding of this.         JOSE ARGUETA MD             D: 2017 10:15   T: 2017 11:08   MT: KARY      Name:     TAMIKA MARC   MRN:      [id number]        Account:      [id number]   :      1985           Admitted:     [id number]      Document: [id number]

## 2017-09-28 NOTE — PLAN OF CARE
"Problem: Overarching Goals (Adult)  Goal: Adheres to Safety Considerations for Self and Others  Outcome: No Change     Declan Nation is a 32 year old male admitted to station 10 due to not being stable on his current medication regimen. He is diagnosed with Bipolar type2, PTSD, INOCENTE, and ADHD. In the last week he has had intrusive thoughts about suicide and thought that his 12 year old neighbor who watches his daughters was going to stab them and then slit her own throat and kill herself as well. At one point the patient was sitting on the couch, playing on his phone and he suddenly had thoughts of going into the kitchen and cutting up his harms. He realized that these were inappropriate thoughts and did not act them, but would like to not have them at all. He is  and has 4 daughters, 6, 4, 2 and almost 1 year old. They live in a home and are both unemployed. His parents have been successful business owners and financially support Declan and his family. He is calm and cooperative. He states he doesn't have any SI or SIB thoughts. He says he has SI thoughts that never have a plan, but that he sometimes wonders if someone were to get shot he would be okay with it. He has been started on latuda and feels that it can keep him somewhat stable, but not as stable and he would like to be. Patient says he has tried just about every anti-seizure med out there and it has not worked. He has not tried lithium and would like to keep it for a last resort. Patient reports that his bipolar thoughts come faster than most and he becomes very manic even while he is in a \"low phase\" and that it creates an \"energetic, horrible depression.\" He reports that he is in chronic pain and that he has had surgery on just about every major joint in his body including his shoulders, wrists, and legs. No behavioral concerns at this time. Patient was calm and cooperative throughout the entire admission. He did appear very anxious and became " tearful at points.

## 2017-09-28 NOTE — PROGRESS NOTES
Patient is very calm and social with others. Pt is attending groups and participating in groups. Pt still feels depressed and he has been sleeping for most of the shift. Pt has no thoughts of suicidal ideation. Pt's daily goal is to get better and so he can get out of here faster. Pt has no thoughts of self inuring himself.  Np SI or SIB.

## 2017-09-29 VITALS
BODY MASS INDEX: 26.35 KG/M2 | WEIGHT: 205.3 LBS | SYSTOLIC BLOOD PRESSURE: 119 MMHG | RESPIRATION RATE: 16 BRPM | DIASTOLIC BLOOD PRESSURE: 75 MMHG | TEMPERATURE: 97 F | HEART RATE: 73 BPM | HEIGHT: 74 IN | OXYGEN SATURATION: 97 %

## 2017-09-29 PROCEDURE — 25000132 ZZH RX MED GY IP 250 OP 250 PS 637: Performed by: PSYCHIATRY & NEUROLOGY

## 2017-09-29 PROCEDURE — 99239 HOSP IP/OBS DSCHRG MGMT >30: CPT | Performed by: PSYCHIATRY & NEUROLOGY

## 2017-09-29 PROCEDURE — 25000132 ZZH RX MED GY IP 250 OP 250 PS 637: Performed by: EMERGENCY MEDICINE

## 2017-09-29 RX ORDER — LITHIUM CARBONATE 300 MG/1
300 TABLET, FILM COATED, EXTENDED RELEASE ORAL EVERY 12 HOURS
Qty: 60 TABLET | Refills: 0 | Status: SHIPPED | OUTPATIENT
Start: 2017-09-29

## 2017-09-29 RX ADMIN — TRAZODONE HYDROCHLORIDE 50 MG: 50 TABLET ORAL at 00:01

## 2017-09-29 RX ADMIN — HYDROXYZINE HYDROCHLORIDE 50 MG: 25 TABLET ORAL at 00:01

## 2017-09-29 RX ADMIN — DIAZEPAM 10 MG: 5 TABLET ORAL at 10:11

## 2017-09-29 RX ADMIN — ACETAMINOPHEN 975 MG: 325 TABLET, FILM COATED ORAL at 12:31

## 2017-09-29 RX ADMIN — LITHIUM CARBONATE 300 MG: 300 TABLET, EXTENDED RELEASE ORAL at 10:10

## 2017-09-29 ASSESSMENT — ACTIVITIES OF DAILY LIVING (ADL)
ORAL_HYGIENE: INDEPENDENT
LAUNDRY: WITH SUPERVISION
DRESS: INDEPENDENT
GROOMING: INDEPENDENT

## 2017-09-29 NOTE — PLAN OF CARE
Problem: Mood Impairment (Depressive Signs/Symptoms) (Adult)  Intervention: Promote Mood Improvement  Pt given copy of discharge instructions and medication administration instructions. All discharge plans  were discussed with patient. Pt reports no further questions at this time regarding discharge plans or medications. Pt denies any suicidal ideation, plans or intent at this time. All belongings returned to patient. Plan is to continue with adjusted medications and follow up with outpatient psychiatrist and counselor. Patient discharged with father.

## 2017-09-29 NOTE — PROGRESS NOTES
"SPIRITUAL HEALTH SERVICES  SPIRITUAL ASSESSMENT Progress Note  [facility] ([facility]) 10N     PRIMARY FOCUS:     Emotional/spiritual/Scientology distress    Support for coping    REFERRAL SOURCE: Request for hospital .    ILLNESS CIRCUMSTANCES:   Reviewed documentation. Reflective conversation shared with pt Declan which integrated elements of illness and family narratives.     Context of Serious Illness/Symptom(s) - Management of bipolar, trying a new medication, four small children at home who contribute to stress.    Resources for Support - Wife, parents, psychiatrist, counselor.    DISTRESS:     Emotional/Spiritual/Existential Distress - Thoughts of self-harm    Baptism Distress - None stated    Social/Cultural/Economic Distress - Stress of four small children at home, pt is physically limited and has had 15 orthopedic surgeries which limits his ability to do physical things at home.    SPIRITUAL/Shinto COPING:     Islam/Ana Paula - \"I am Sikh, I love me some Collin.\"    Spiritual Practice(s) - Reads devotionals, stated Sikh ana paula    Emotional/Relational/Existential Connections - Stated connection with Collin, \"I believe God created science,\" belief that God can work through science.    GOALS OF CARE:    Goals of Care - Pt and I spoke about his stress of having four children at home.  I gave permission to name that part of parenting is that children can be stressful.  Pt stated \"I feel guilty saying that family can be stressful sometimes.\"  I affirmed that while families can be a source of support, that all relationships can also experience times of stress.  Pt stated, \"I found out I was going home today, I'm nervous about going home.  When I'm at home with my kids I'm not totally sure what my triggers are.\"  I encouraged pt to continue exploring this area with his counselor.  I spoke with pt to help him name the resources he has for his self-care and especially mental health care outside the " "hospital.  I provided encouragement and prayer for the pt at his request.  Pt seemed to be visibly less anxious and more relaxed at the end of the visit.    Meaning/Sense-Making - Pt stated \"I believe God created science\" and affirmed with pt that God can work through science.  Pt stated one of the doctors here told him a story about a [place] in [facility] [facility] where Lithium (pt stated this is the drug he is going on) is in the drinking water, and in this village, there is no known suicide or self-harm.  This story was powerful for the pt and seemed to give him hope.  A good deal of sense and hope seemed to come from the idea that God can indeed work through doctors, counselors, nurses, and other individuals in the medical/behavorial health fields.      PLAN: No further plan at this time, pt and staff stated he is going to be discharged today.    Joslyn Aguilar  Chaplain Resident  Pager [phone]  "

## 2017-09-29 NOTE — DISCHARGE INSTRUCTIONS
Behavioral Discharge Planning and Instructions      Summary:  You were admitted on 9/26/2017  due to PTSD (Post Tramatic Stress Disorder), Bipolar II Disorder  and Suicidal Ideations.  You were treated by Dr. Kentrell Thayer MD and discharged on 09/29/17 from [facility] to Home to follow up with your outpatient providers.    Principal Diagnosis: Bipolar II Disorder     Health Care Follow-up Appointments:   [facility]   [address]  [place] [address]  Phone: [phone]  Psychiatrist: Dr. Gaetano Kay    [facility]   [address]  [place] [address]   Phone: [phone]   Therapist: Dr. Aman Guevara     If no appointments scheduled, explain: Message were left to schedule. Patient is able to independently schedule his appointments following discharge  Attend all scheduled appointments with your outpatient providers. Call at least 24 hours in advance if you need to reschedule an appointment to ensure continued access to your outpatient providers.   Major Treatments, Procedures and Findings:  You were provided with: a psychiatric assessment, assessed for medical stability, medication evaluation and/or management, group therapy and milieu management    Symptoms to Report: feeling more aggressive, increased confusion, losing more sleep, mood getting worse or thoughts of suicide    Early warning signs can include: increased depression or anxiety sleep disturbances increased thoughts or behaviors of suicide or self-harm  increased unusual thinking, such as paranoia or hearing voices    Safety and Wellness:  Take all medicines as directed.  Make no changes unless your doctor suggests them.      Follow treatment recommendations.  Refrain from alcohol and non-prescribed drugs.  Ask your support system to help you reduce your access to items that could harm yourself or others. If there is a concern for safety, call 911.    Resources:   Crisis  "Intervention: 820.927.8094 or 269-338-3844 (TTY: 696.644.8002).  Call anytime for help.  National [place] on Mental Illness (www.mn.lay.org): 741.590.4612 or 139-225-6462.  Alcoholics Anonymous (www.alcoholics-anonymous.org): Check your phone book for your local chapter.  Suicide Awareness Voices of Education (SAVE) (www.save.org): 164-218-IIJP (4962)  National Suicide Prevention Line (www.mentalhealthmn.org): 601-616-LYPG (5641)  Mental Health Consumer/Survivor Network of [place] (www.mhcsn.net): 122.683.6903 or 209-783-5382  Mental Health Association [place] (www.mentalhealth.org): 768.946.5672 or 660-520-7790  Self- Management and Recovery Training., [employer]-- Toll free: 793.109.9195  www."[employer],".gDecide  Text 4 Life: txt \"LIFE\" to 16289 for immediate support and crisis intervention  Crisis text line: Text \"START\" to 154-882. Free, confidential, 24/7.  Crisis Intervention: 206.353.2514 or 969-329-2995. Call anytime for help.     The treatment team has appreciated the opportunity to work with you.     Please take care and make your recovery a daily recovery.   If you have any questions or concerns our unit number is [phone].  Thank you.      "

## 2017-09-29 NOTE — DISCHARGE SUMMARY
[facility], [place]   Psychiatric Discharge Summary  Time: 42 minutes on encounter.    Declan Nation MRN# [id number]   Age: 32 year old YOB: 1985     Date of Admission:  9/26/2017  Date of Discharge:  09/29/17  Admitting Physician:  Shun Brown  Discharge Physician:  Shun Brown         Event Leading to Hospitalization and Hospital Course:   Declan Nation was admitted for concerning thoughts of self-harm and intrusive thoughts about next door neighbor harming his children.        See Admission note by Flaca on 9/28 for additional details.     Declan Nation was hospitalized for obsessional thoughts related to harming himself and a fault about the neighbor possibly harming his children. He felt this was concerning and told his wife and presents to the hospital. The thoughts quickly resolved prior to even coming into the inpatient psychiatric unit. He was started on lithium primarily related to his mood being slightly low in comparison to where she would like to be but understands that with bipolar disorder he does not want to become hypomanic. He was placed on 300 mg of lithium twice per day by my colleague that did the admission.    He has not had any difficulty on the unit has been attending groups he is not had any trouble tolerating the medication we discussed the side effects that are potential with his current medicines. We had a lengthy discussion about his future medication management would include maximizing the lurasidone and possibly maximizing the lithium. If this is not effective with then start discontinuing medications that are not effective. Would consider going back to antidepressant medications and having some type of mood stabilizing agent at the same time such as an antipsychotic that may be he has not tried at this point. This would be a slow process of titration and discontinuation of medication. He does seem as though he does  need something for anxiety coverage to prevent as well as as needed. We discussed in detail the alejandro totter of bipolar illness and that we would maybe have to give antidepressant medication when he is low and take away when he becomes hypomanic. He was accepting of all this information and discharge he was not having any thoughts of hurting himself or others or any paranoia or hallucinations or any obsessional thinking. He is hopeful and is looking forward to his future and he has been sleeping well generally. He does have some continued symptoms of depression which include anhedonia.           DIagnoses:   Bipolar 2 disorder most recent episode depressed  Posttraumatic stress disorder history  Generalized anxiety disorder  History of ADHD  Possible personality disorder characteristics         Labs:   No results found for this or any previous visit (from the past 24 hour(s)).         Consults:   No consultations were requested during this admission           Discharge Medications:        Review of your medicines      START taking       Dose / Directions    lithium 300 MG CR tablet   Commonly known as:  ESKALITH/LITHOBID        Dose:  300 mg   Take 1 tablet (300 mg) by mouth every 12 hours   Quantity:  60 tablet   Refills:  0         CONTINUE these medicines which have NOT CHANGED       Dose / Directions    acetaminophen 500 MG tablet   Commonly known as:  TYLENOL        Dose:  1000 mg   Take 1,000 mg by mouth every 6 hours as needed for pain   Refills:  0       cetirizine 10 MG tablet   Commonly known as:  zyrTEC        Dose:  10 mg   Take 10 mg by mouth daily   Refills:  0       DIAZEPAM PO   Indication:  Feeling Anxious        Dose:  10 mg   Take 10 mg by mouth 2 times daily as needed for anxiety Patient reported he takes 10 mg in the morning when he wakes up and as needed in the evening   Refills:  0       LATUDA PO        Dose:  80 mg   Take 80 mg by mouth At Bedtime Take at 1030 pm   Refills:  0        methylphenidate 10 MG tablet   Commonly known as:  RITALIN        Dose:  10 mg   Take 10 mg by mouth 2 times daily Patient reports he takes in the morning and at 1730   Refills:  0       nexIUM 20 MG CR capsule   Generic drug:  esomeprazole        Dose:  20 mg   Take 20 mg by mouth daily (with dinner) Take 30-60 minutes before eating.   Refills:  0       propranolol 20 MG tablet   Commonly known as:  INDERAL        Dose:  20 mg   Take 20 mg by mouth 2 times daily as needed (EPS)   Refills:  0       ZYPREXA ZYDIS 5 MG ODT tab   Generic drug:  OLANZapine zydis        Dose:  5-10 mg   Take 5-10 mg by mouth daily as needed (anxiety)   Refills:  0            Where to get your medicines      These medications were sent to [facility] [place], [place] - [address]  [address] [facility] [address] [address]     Phone:  [phone]      lithium 300 MG CR tablet                  Mental Status Examination:   Declan is a 32-year-old male wearing a green sweater. His speech Appropriate Rate and Tone and His Language Is Intact. His Behavior Is Appropriate and His Gait Is Intact. His affect is neutral and he smiles at times. His mood he describes as pretty well. His thought content consists of the above issues without thoughts of harming himself or others or current delusions. His thought process is logical without looseness of association. He does not have any abnormal perceptions. He is alert aware of his current location and treatment. His long-term/short-term/remote memory appears intact. His cognition and fund of knowledge appears a normal caliber. His insight and judgment are both good.         Discharge Plan:     Reason for your hospital stay   Bipolar 2, obsessions     Activity   Your activity upon discharge: activity as tolerated     Discharge Instructions   1. Please do not harm yourself or others.  2. Please continue to take your medications.  3. Please follow up with your outpatient care  team.  4. Please do not take drugs or alcohol as this will worsen your condition.  5. Please do not take more than the prescribed doses of medications as this may make them dangerous.   6. Please follow your safety plan of action.  7. Please call crisis if having trouble.  8. If having thoughts of harming self or others please come in to the emergency department as soon as possible.     Full Code     Diet   Follow this diet upon discharge: Orders Placed This Encounter     Regular Diet Adult             Further instructions from your care team        Behavioral Discharge Planning and Instructions      Summary:  You were admitted on 9/26/2017  due to PTSD (Post Tramatic Stress Disorder), Bipolar II Disorder  and Suicidal Ideations.  You were treated by Dr. Kentrell Thayer MD and discharged on 09/29/17 from [facility] to Home.     Principal Diagnosis: Bipolar II Disorder     Health Care Follow-up Appointments:   [facility] [address]  [place] [address]  Phone: [phone]  Psychiatrist: Dr. Gaetano Kay    [facility] [place]   [address] [place] [address] [address]  [place] [address]   Phone: [phone]   Therapist: Dr. Aman Guevara       Attend all scheduled appointments with your outpatient providers. Call at least 24 hours in advance if you need to reschedule an appointment to ensure continued access to your outpatient providers.   Major Treatments, Procedures and Findings:  You were provided with: a psychiatric assessment, assessed for medical stability, medication evaluation and/or management, group therapy and milieu management    Symptoms to Report: feeling more aggressive, increased confusion, losing more sleep, mood getting worse or thoughts of suicide    Early warning signs can include: increased depression or anxiety sleep disturbances increased thoughts or behaviors of suicide or self-harm  increased unusual thinking, such as paranoia or hearing  "voices    Safety and Wellness:  Take all medicines as directed.  Make no changes unless your doctor suggests them.      Follow treatment recommendations.  Refrain from alcohol and non-prescribed drugs.  Ask your support system to help you reduce your access to items that could harm yourself or others. If there is a concern for safety, call 911.    Resources:   Crisis Intervention: 617.810.2867 or 337-986-0500 (TTY: 577.509.8689).  Call anytime for help.  National [place] on Mental Illness (www.mn.lay.org): 432.926.7424 or 126-110-1353.  Alcoholics Anonymous (www.alcoholics-anonymous.org): Check your phone book for your local chapter.  Suicide Awareness Voices of Education (SAVE) (www.save.org): 805-533-ZMRX (9393)  National Suicide Prevention Line (www.mentalhealthmn.org): 872-529-SPGD (8066)  Mental Health Consumer/Survivor Network of [place] (www.mhcsn.net): 970.439.4743 or 727-377-2848  Mental Health Association of [place] (www.mentalhealth.org): 582.699.8373 or 872-844-3777  Self- Management and Recovery Training., [employer]-- Toll free: 832.707.5898  www.[employer].Zeer  Text 4 Life: txt \"LIFE\" to 49269 for immediate support and crisis intervention  Crisis text line: Text \"START\" to 451-873. Free, confidential, 24/7.  Crisis Intervention: 433.185.5690 or 912-353-2141. Call anytime for help.     The treatment team has appreciated the opportunity to work with you.     Please take care and make your recovery a daily recovery.   If you have any questions or concerns our unit number is [phone].  Thank you.              Please send copy to Dr. Gaetano Kay    During hospitalizations patients have perpetuating, complicating, situational, acute, and chronic conditions that lead to risk for suicidality or homicidality. During hospitalizations we mitigate any modifiable risk factors that may have been identified in the history and physical examination and throughout the hospitalization. Chronic non-modifiable risk factors " are not able to be changed with acute hospitalization. As a patient that is discharging these risk factors have been modified as much as possible and a supportive network and safety plan has been put in place. Further modifications and assistance will have to be obtained in the outpatient setting and the inpatient hospitalization team is no longer responsible for outcomes.    Shun Brown  [facility]      The following document has been created with voice recognition software and may contain unintentional word substitutions.

## 2017-09-29 NOTE — PROGRESS NOTES
Pt was visible in the milieu most of shift, and somewhat social with peers.  Pt main concern is getting discharged after adequate medication adjustment.  Pt denied any suicidal thinking, denied depression, but endorsed low to moderate levels of anxiety.     09/28/17 2210   Behavioral Health   Hallucinations denies / not responding to hallucinations   Thinking intact   Orientation person: oriented;place: oriented;date: oriented;time: oriented   Memory baseline memory   Insight admits / accepts   Eye Contact at examiner   Affect blunted, flat   Mood depressed   Physical Appearance/Attire attire appropriate to age and situation   Hygiene well groomed   Suicidality other (see comments)  (pt denies)   Self Injury other (see comment)  (pt denies)   Elopement (None stated or observed)   Activity other (see comment)  (visible and social in the milieu)   Speech clear;coherent   Medication Sensitivity no stated side effects;no observed side effects   Psychomotor / Gait balanced;steady   Coping/Psychosocial   Verbalized Emotional State acceptance;anxiety;depression   Psycho Education   Type of Intervention 1:1 intervention   Response participates, initiates socially appropriate   Hours (10)   Treatment Detail mh check in   Activities of Daily Living   Hygiene/Grooming independent   Oral Hygiene independent   Dress independent   Laundry with supervision   Room Organization independent

## 2017-09-29 NOTE — PROGRESS NOTES
Attended 2 of 3 OT groups offered. Actively participated in discussions and activities surrounding strengths and self nurturing/care. Conversation focused on his chronic pain/ physical limitations. Noted he mostly is in bed all day playing video/phone games to distract himself. Had a difficult time identifying strengths, however, with prompting/cuing he was able to identify strengths and acknowledge how focusing on them could be beneficial in his daily living. Again, in a group focused on self nurturing/care, he was focused on his physical limitations limiting his ability to identify or explore healthy self care/ self management activities or techniques.Pt was given and completed a written self assessment. OT purpose was explained with a value of having involvement in tx plan, and provided options to meet self identified goals. Will assess further in the areas of organization, problem solving, and concentration.

## 2020-09-01 ENCOUNTER — AMBULATORY - HEALTHEAST (OUTPATIENT)
Dept: SURGERY | Facility: CLINIC | Age: 35
End: 2020-09-01

## 2020-09-01 DIAGNOSIS — Z11.59 ENCOUNTER FOR SCREENING FOR OTHER VIRAL DISEASES: ICD-10-CM

## 2020-09-16 ENCOUNTER — OFFICE VISIT - RIVER FALLS (OUTPATIENT)
Dept: FAMILY MEDICINE | Facility: CLINIC | Age: 35
End: 2020-09-16

## 2020-09-16 ASSESSMENT — MIFFLIN-ST. JEOR: SCORE: 2003.55

## 2020-11-02 ENCOUNTER — AMBULATORY - HEALTHEAST (OUTPATIENT)
Dept: LAB | Facility: CLINIC | Age: 35
End: 2020-11-02

## 2020-11-02 DIAGNOSIS — Z11.59 ENCOUNTER FOR SCREENING FOR OTHER VIRAL DISEASES: ICD-10-CM

## 2020-11-03 ASSESSMENT — MIFFLIN-ST. JEOR: SCORE: 1985.86

## 2020-11-04 ENCOUNTER — COMMUNICATION - HEALTHEAST (OUTPATIENT)
Dept: SCHEDULING | Facility: CLINIC | Age: 35
End: 2020-11-04

## 2020-11-04 ENCOUNTER — ANESTHESIA - HEALTHEAST (OUTPATIENT)
Dept: SURGERY | Facility: CLINIC | Age: 35
End: 2020-11-04

## 2020-11-05 ENCOUNTER — SURGERY - HEALTHEAST (OUTPATIENT)
Dept: SURGERY | Facility: CLINIC | Age: 35
End: 2020-11-05

## 2020-11-05 ASSESSMENT — MIFFLIN-ST. JEOR: SCORE: 1985.86

## 2021-06-03 ENCOUNTER — AMBULATORY - HEALTHEAST (OUTPATIENT)
Dept: SURGERY | Facility: CLINIC | Age: 36
End: 2021-06-03

## 2021-06-03 DIAGNOSIS — Z11.59 ENCOUNTER FOR SCREENING FOR OTHER VIRAL DISEASES: ICD-10-CM

## 2021-06-07 ENCOUNTER — RECORDS - HEALTHEAST (OUTPATIENT)
Dept: ADMINISTRATIVE | Facility: OTHER | Age: 36
End: 2021-06-07

## 2021-06-12 NOTE — ANESTHESIA POSTPROCEDURE EVALUATION
Patient: Declan Nation  Procedure(s):  GASTROCNEMIUS RECESSION (Right)  CALCANEAL OSTEOTOMY, JOSAFAT OSTEOTOMY (Right)  TARSAL TUNNEL DECOMPRESSION, RIGHT ANKLE (Right)  Anesthesia type: general    Patient location: PACU  Last vitals:   Vitals Value Taken Time   /58 11/05/20 1132   Temp 36.3  C (97.3  F) 11/05/20 1050   Pulse 73 11/05/20 1134   Resp 16 11/05/20 1130   SpO2 99 % 11/05/20 1134   Vitals shown include unvalidated device data.  Post vital signs: stable  Level of consciousness: awake and responds to simple questions  Post-anesthesia pain: pain controlled  Post-anesthesia nausea and vomiting: no  Pulmonary: unassisted, return to baseline  Cardiovascular: stable and blood pressure at baseline  Hydration: adequate  Anesthetic events: no    QCDR Measures:  ASA# 11 - Lexi-op Cardiac Arrest: ASA11B - Patient did NOT experience unanticipated cardiac arrest  ASA# 12 - Lexi-op Mortality Rate: ASA12B - Patient did NOT die  ASA# 13 - PACU Re-Intubation Rate: ASA13B - Patient did NOT require a new airway mgmt  ASA# 10 - Composite Anes Safety: ASA10A - No serious adverse event    Additional Notes:

## 2021-06-12 NOTE — ANESTHESIA CARE TRANSFER NOTE
Last vitals:   Vitals:    11/05/20 1013   BP: 109/52   Pulse: 72   Resp: 12   Temp: 36.4  C (97.5  F)   SpO2: 100%     Patient's level of consciousness is awake  Spontaneous respirations: yes  Maintains airway independently: yes  Dentition unchanged: yes  Oropharynx: oropharynx clear of all foreign objects    QCDR Measures:  ASA# 20 - Surgical Safety Checklist: WHO surgical safety checklist completed prior to induction    PQRS# 430 - Adult PONV Prevention: 4558F - Pt received => 2 anti-emetic agents (different classes) preop & intraop  ASA# 8 - Peds PONV Prevention: NA - Not pediatric patient, not GA or 2 or more risk factors NOT present  PQRS# 424 - Lexi-op Temp Management: 4559F - At least one body temp DOCUMENTED => 35.5C or 95.9F within required timeframe  PQRS# 426 - PACU Transfer Protocol: - Transfer of care checklist used  ASA# 14 - Acute Post-op Pain: ASA14B - Patient did NOT experience pain >= 7 out of 10

## 2021-06-12 NOTE — ANESTHESIA PROCEDURE NOTES
Peripheral Block    Patient location during procedure: pre-op  Start time: 11/5/2020 7:05 AM  End time: 11/5/2020 7:08 AM  post-op analgesia per surgeon order as noted in medical record  Staffing:  Performing  Anesthesiologist: Nina Jaime MD  Preanesthetic Checklist  Completed: patient identified, site marked, risks, benefits, and alternatives discussed, timeout performed, consent obtained, at patient's request, airway assessed, oxygen available, suction available, emergency drugs available and hand hygiene performed  Peripheral Block  Block type: sciatic, popliteal  Prep: ChloraPrep  Patient position: supine (right leg elevated)  Patient monitoring: cardiac monitor, continuous pulse oximetry, heart rate and blood pressure  Laterality: right  Injection technique: ultrasound guided    Ultrasound used to visualize needle placement in proximity to nerve being blocked: yes   US used to visualize anesthetic spread  Visualized anatomic structures normal  No Pathological Findings  Permanent ultrasound image captured for medical record  Sterile gel and probe cover used for ultrasound.

## 2021-06-12 NOTE — ANESTHESIA PREPROCEDURE EVALUATION
Anesthesia Evaluation      Patient summary reviewed   No history of anesthetic complications     Airway   Mallampati: II  Neck ROM: full  Comment: 2 finger breath mouth opening, limited TMJ mobility   Pulmonary     breath sounds clear to auscultation                         Cardiovascular   Rhythm: regular  Rate: normal,         Neuro/Psych    (+) depression, chronic pain    Comments: Bipolar disorder    Endo/Other    (-) no obesity     GI/Hepatic/Renal       Other findings:   COVID negative 11/2      Dental      Comment: Good dentition, no loose or removable teeth                       Anesthesia Plan  Planned anesthetic: peripheral nerve block, total IV anesthesia and general LMA  Single shot popliteal nerve block r/b/a discussed and patient in agreement to proceed   Acetaminophen, Mg 4 g (pre-op), Precedex gtt, ketamine on induction  Dexamethasone 10 mg, ondansetron 4 mg  ASA 2   Induction: intravenous   Anesthetic plan and risks discussed with: patient  Anesthesia plan special considerations: antiemetics,   Post-op plan: routine recovery

## 2021-06-15 ENCOUNTER — RECORDS - HEALTHEAST (OUTPATIENT)
Dept: ADMINISTRATIVE | Facility: OTHER | Age: 36
End: 2021-06-15

## 2021-06-15 ENCOUNTER — AMBULATORY - HEALTHEAST (OUTPATIENT)
Dept: LAB | Facility: CLINIC | Age: 36
End: 2021-06-15

## 2021-06-15 DIAGNOSIS — Z11.59 ENCOUNTER FOR SCREENING FOR OTHER VIRAL DISEASES: ICD-10-CM

## 2021-06-16 LAB
SARS-COV-2 PCR COMMENT: NORMAL
SARS-COV-2 RNA SPEC QL NAA+PROBE: NEGATIVE
SARS-COV-2 VIRUS SPECIMEN SOURCE: NORMAL

## 2021-06-17 ENCOUNTER — COMMUNICATION - HEALTHEAST (OUTPATIENT)
Dept: SCHEDULING | Facility: CLINIC | Age: 36
End: 2021-06-17

## 2021-06-17 ENCOUNTER — ANESTHESIA - HEALTHEAST (OUTPATIENT)
Dept: SURGERY | Facility: CLINIC | Age: 36
End: 2021-06-17

## 2021-06-17 ENCOUNTER — HOSPITAL ENCOUNTER (OUTPATIENT)
Dept: SURGERY | Facility: CLINIC | Age: 36
Discharge: HOME | End: 2021-06-17
Attending: PODIATRIST | Admitting: PODIATRIST
Payer: COMMERCIAL

## 2021-06-17 ENCOUNTER — RECORDS - HEALTHEAST (OUTPATIENT)
Dept: ADMINISTRATIVE | Facility: OTHER | Age: 36
End: 2021-06-17

## 2021-06-17 ENCOUNTER — SURGERY - HEALTHEAST (OUTPATIENT)
Dept: SURGERY | Facility: CLINIC | Age: 36
End: 2021-06-17
Payer: COMMERCIAL

## 2021-06-17 DIAGNOSIS — Z98.890 S/P FOOT SURGERY: ICD-10-CM

## 2021-06-26 NOTE — ANESTHESIA CARE TRANSFER NOTE
Last vitals:   Vitals:    06/17/21 1227   BP: (P) 158/84   Pulse: (P) 94   Resp: (P) 10   Temp: (P) 36.3  C (97.3  F)   SpO2: (P) 100%     Patient's level of consciousness is awake and drowsy  Spontaneous respirations: yes  Maintains airway independently: yes  Dentition unchanged: yes  Oropharynx: oral airway in place    QCDR Measures:  ASA# 20 - Surgical Safety Checklist: WHO surgical safety checklist completed prior to induction    PQRS# 430 - Adult PONV Prevention: 4558F-8P - Pt did NOT receive => 2 anti-emetic agents  ASA# 8 - Peds PONV Prevention: NA - Not pediatric patient, not GA or 2 or more risk factors NOT present  PQRS# 424 - Lexi-op Temp Management: 4559F - At least one body temp DOCUMENTED => 35.5C or 95.9F within required timeframe  PQRS# 426 - PACU Transfer Protocol: - Transfer of care checklist used  ASA# 14 - Acute Post-op Pain: ASA14B - Patient did NOT experience pain >= 7 out of 10

## 2021-06-26 NOTE — ANESTHESIA PREPROCEDURE EVALUATION
Anesthesia Evaluation      Patient summary reviewed   No history of anesthetic complications     Airway   Mallampati: I  Neck ROM: full   Pulmonary - negative ROS and normal exam                          Cardiovascular - negative ROS and normal exam   Neuro/Psych - negative ROS   (+) depression, anxiety/panic attacks, chronic pain    Endo/Other - negative ROS      GI/Hepatic/Renal - negative ROS           Dental - normal exam                        Anesthesia Plan  Planned anesthetic: general LMA and peripheral nerve block  Peripheral nerve block for post-op analgesia as ordered by surgeon.  Popliteal nerve block.  ASA 2   Induction: intravenous   Anesthetic plan and risks discussed with: patient    Post-op plan: routine recovery

## 2021-06-26 NOTE — BRIEF OP NOTE
Brief Operative Note    Name:  Declan Nation  Location: [facility] Main OR  Procedure Date:  6/17/2021  PCP:  Jason Field MD      LEFT ANKLE GASTROC RECESSION, (Left), CALCANEAL SLIDE OSTEOTOMY, PLANTAR FLEXION MEDIAL CUNEIFORM OSTEOTOMY, (Left), ANKLE ARTHROSCOPY, (Left), BROSTROM PROCEDURE WITH INTERNAL BRACE (Left)    Pre-Procedure Diagnosis:    Equinus contracture of left ankle [M24.572]  Left ankle instability [M25.372]  Impingement of left ankle joint [M25.872]  Acquired planovalgus deformity of left foot [M21.6X2]     Post-Procedure Diagnosis:    Same as pre-operative diagnosis      Surgeon(s):  Roge Buchanan DPM    Findings: None    Estimated Blood Loss: 7 mL from 6/17/2021  9:36 AM to 6/17/2021 12:25 PM      Specimens:    * No specimens in log *       Drains: none       Implants:  [unfilled]    Complications:    None    Roge Buchanan     Date: 6/17/2021  Time: 12:38 PM

## 2021-06-26 NOTE — ANESTHESIA PROCEDURE NOTES
Peripheral Block    Patient location during procedure: pre-op  Start time: 6/17/2021 8:39 AM  End time: 6/17/2021 8:44 AM  post-op analgesia per surgeon order as noted in medical record  Staffing:  Performing  Anesthesiologist: Shun Dickson MD  Preanesthetic Checklist  Completed: patient identified, site marked, risks, benefits, and alternatives discussed, timeout performed, consent obtained, airway assessed, oxygen available, suction available, emergency drugs available and hand hygiene performed  Peripheral Block  Block type: sciatic, popliteal  Prep: ChloraPrep  Patient position: supine  Patient monitoring: blood pressure, heart rate, continuous pulse oximetry and cardiac monitor  Laterality: left  Injection technique: ultrasound guided    Ultrasound used to visualize needle placement in proximity to nerve being blocked: yes   US used to visualize anesthetic spread  Visualized anatomic structures normal  No Pathological Findings  Permanent ultrasound image captured for medical record  Sterile gel and probe cover used for ultrasound.  Needle  Needle type: Stimuplex   Needle gauge: 22 G  Needle length: 6 in  no peripheral nerve catheter placed  Assessment  Injection assessment: negative aspiration for heme, no paresthesia on injection, incremental injection and no difficulty with injection  Additional Notes  30ml 0.5% bupivacaine w/ epi 1:200K injected for nerve block.

## 2021-06-26 NOTE — ANESTHESIA POSTPROCEDURE EVALUATION
Patient: Declan Nation  Procedure(s):  LEFT ANKLE GASTROC RECESSION, (Left)  CALCANEAL SLIDE OSTEOTOMY, PLANTAR FLEXION MEDIAL CUNEIFORM OSTEOTOMY, (Left)  ANKLE ARTHROSCOPY, (Left)  BROSTROM PROCEDURE WITH INTERNAL BRACE (Left)  Anesthesia type: general    Patient location: PACU  Last vitals:   Vitals Value Taken Time   /82 06/17/21 1320   Temp 36.9  C (98.5  F) 06/17/21 1320   Pulse 93 06/17/21 1322   Resp 39 06/17/21 1322   SpO2 99 % 06/17/21 1322   Vitals shown include unvalidated device data.  Post vital signs: stable  Level of consciousness: awake and responds to simple questions  Post-anesthesia pain: pain controlled  Post-anesthesia nausea and vomiting: no  Pulmonary: unassisted, return to baseline  Cardiovascular: stable and blood pressure at baseline  Hydration: adequate  Anesthetic events: no    QCDR Measures:  ASA# 11 - Lexi-op Cardiac Arrest: ASA11B - Patient did NOT experience unanticipated cardiac arrest  ASA# 12 - Lexi-op Mortality Rate: ASA12B - Patient did NOT die  ASA# 13 - PACU Re-Intubation Rate: ASA13B - Patient did NOT require a new airway mgmt  ASA# 10 - Composite Anes Safety: ASA10A - No serious adverse event    Additional Notes:

## 2021-06-26 NOTE — PROGRESS NOTES
Pharmacist completed medication history with the patient while in the KEY.  Prior to admission (PTA) med list completed and updated in the electronic medical record (EMR).  Pharmacy Note - Admission Medication History  Pertinent Provider Information: none   ______________________________________________________________________  Prior To Admission (PTA) med list completed and updated in EMR.     PTA Med List   Medication Sig Last Dose     acetaminophen (TYLENOL) 500 MG tablet Take 1,000 mg by mouth every 6 (six) hours as needed for pain. 6/15/2021     busPIRone (BUSPAR) 7.5 MG tablet Take 7.5 mg by mouth 2 (two) times a day. 6/17/2021 at Unknown time     clomiPHENE (CLOMID) 50 mg tablet Take 25 mg by mouth daily.  6/17/2021 at Unknown time     fluticasone propionate (FLONASE) 50 mcg/actuation nasal spray Apply 2 sprays into each nostril 2 (two) times a day as needed.  6/16/2021 at Unknown time     ibuprofen (ADVIL,MOTRIN) 200 MG tablet Take 800 mg by mouth every 6 (six) hours as needed for pain. 6/3/2021     ISOtretinoin (ACCUTANE) 40 MG capsule Take 40 mg by mouth 2 (two) times a day.  6/17/2021 at Unknown time     loratadine (CLARITIN) 10 mg tablet Take 10 mg by mouth daily.  6/17/2021 at Unknown time     naloxone (NARCAN) 4 mg/actuation nasal spray by Intranasal route as needed for opioid reversal. 1 spray (4 mg dose) into one nostril for opioid reversal. Call 911. May repeat if no response in 3 minutes. never used     polyethylene glycol (MIRALAX) 17 gram packet Take 17 g by mouth daily as needed. 6/15/2021     tapentadoL (NUCYNTA ER) 50 mg Tb12 Take 50 mg by mouth 2 (two) times a day. 6/16/2021 at Unknown time     tapentadoL (NUCYNTA) 50 mg tablet Take  mg by mouth every 6 (six) hours as needed.  6/16/2021 at Unknown time     triamcinolone (KENALOG) 0.025 % cream Apply topically 3 (three) times a day as needed. 6/3/2021       Information source(s): Patient and CareEverywhere/SureScripts  Patient was asked  about OTC/herbal products specifically.  PTA med list reflects this.  Based on the pharmacist s assessment, the PTA med list information appears reliable  Allergies were reviewed, assessed, and updated with the patient.    Medications available for use during hospital stay: NONE  Thank you for the opportunity to participate in the care of this patient.    The patient was asked about OTC/herbal products specifically and the PTA med list reflects the patient's response.    Allergies were reviewed and assessed with the patient, responses were updated in the EMR.    Thank you for the opportunity to participate in the care of this patient.    Chavo Stephenson [facility]     6/17/2021     8:42 AM

## 2021-07-03 NOTE — ADDENDUM NOTE
Addendum Note by Nina Jaime MD at 11/5/2020 11:37 AM     Author: Nina Jaime MD Service: -- Author Type: Physician    Filed: 11/5/2020 11:37 AM Date of Service: 11/5/2020 11:37 AM Status: Signed    : Nina Jaime MD (Physician)       Addendum  created 11/05/20 1137 by Nina Jaime MD    Child order released for a procedure order, Clinical Note Signed, Intraprocedure Blocks edited, Intraprocedure Meds edited

## 2021-07-06 VITALS — BODY MASS INDEX: 24.24 KG/M2 | WEIGHT: 193.9 LBS | BODY MASS INDEX: 24.24 KG/M2 | WEIGHT: 193.9 LBS

## 2021-07-22 NOTE — OP NOTE
Procedure Date: 06/17/2021    SURGEON:  Roge Buchanan DPM    PREOPERATIVE DIAGNOSES:    1.  Planovalgus foot deformity, left.  2.  Ankle equinus, left.  3.  Anterior ankle impingement, left.  4.  Ankle chronic ankle lateral ankle instability, left.    POSTOPERATIVE DIAGNOSES:  1.  Planovalgus foot deformity, left.  2.  Ankle equinus, left.  3.  Anterior ankle impingement, left.  4.  Ankle chronic ankle lateral ankle instability, left.    PROCEDURES:    1.  Ankle arthroscopy with synovectomy using a 3.5 mm Arthrex shaver.  2.  Medializing calcaneal osteotomy with two 5.0 Arthrex headless screws.   3.  Gastroc recession.   4.  Plantar flex rewedge osteotomy, medial cuneiform, left foot using an Arthrex wedge spacer.  5.  Brostrom lateral ankle stabilization procedure, left.    ANESTHESIA:  General with popliteal nerve block.    PREOPERATIVE ANTIBIOTIC: Ancef 2 grams IV.    ESTIMATED BLOOD LOSS:  Minimal.    INDICATIONS FOR PROCEDURE:  The patient is a 36-year-old male who has a history of chronic lateral ankle instability, planovalgus foot deformity as well as ankle equinus.  He is here today for surgical repair as he has failed conservative treatment.  We   discussed the procedure, perioperative course and potential risks and complications with him.  All of his questions.  Consent was obtained.    DESCRIPTION OF PROCEDURE:  The patient was brought to the OR and placed on the table in supine position.  General anesthesia was administered.  Popliteal block was administered by the anesthesia department.  The foot and leg was then prepped and draped   in the usual sterile manner.  A thigh tourniquet was then inflated to 300 mmHg after the foot and leg were exsanguinated using an Esmarch bandage.  Attention was directed first to the posteromedial distal leg where a 4.5 cm posterior medial linear   incision was performed.  He was deepened through the deep fascia.  The gastroc tendon was localized.  A side to side through  and through release was performed.  A gap was created.  There was found to be improved motion at the ankle joint with   dorsiflexion.  The wounds were irrigated with sterile saline.  It was closed in layers with 2-0 Vicryl and then 3-0 nylon.    Attention directed to the lateral heel where a 5 cm posterolateral linear incision was performed posterior to the calcaneus.  It was deepened bluntly to the lateral wall of the calcaneus.  These two C-arm guidance to check the overall placement of my   incision as well as placement of the osteotomy.  Using a sagittal saw, a through and through osteotomy from lateral to medial was performed.  The heel was then transposed medially approximately 3 to 4 mm.  It was then fixated with 2 guide pins from the   Arthrex 5 mm headless screw set.  Two screws were placed in the usual fashion.  These were compression screws.  The position was checked clinically and radiographically and found to be very good.  The wounds were irrigated with sterile saline.  It was   closed in layers with 2-0 Vicryl and then a 4-0 nylon.    Attention directed to the anterior aspect of the ankle as well where 2.5 cm linear incision was performed just medial to the tibialis anterior tendon and then laterally with visualization from the medial portal.  The diagnostic portion of arthroscopy was   begun.  There was found to be a moderate to significant amount of hypertrophic synovitis.  There were no loose bodies.  The cartilage was intact.  No OCD lesions.  Again, there was cut a bit of looseness to the ankle.  Using a 3.5 mm full radius shaver,   the ankle joint and synovectomy was performed.  Final pictures were performed.  Scope was withdrawn.  Skin repaired with 4-0 nylon.    We then directed to the lateral ankle where a 6 cm lazy S incision was performed just inferior to the lateral malleolus into the area of the sinus tarsi.  He was deepened via sharp and blunt dissection with care to identify and retract  all the vascular   structures.  Hemostasis was obtained as needed.  Incision was deepened to the lateral collateral ligaments and ankle joint capsule, which was then incised.  I did roughen the distal fibula.  We inserted an Arthrex internal brace.  It was pulled through   the more proximal portion of the ligament and capsule.  With the foot held in neutral dorsiflexion, I was able to perform a Brostrom with a 2-0 Vicryl, 2-0 Ethibond FiberWire, repair in a pants-over-vest fashion.  The ankle was found to be stable.  We   did add the internal brace.  It was secured into the body of the talus.  Again, stability was checked after repair and found to be very good.  The wound was irrigated with sterile saline.  The wound was closed with 2-0 Vicryl and 4-0 nylon.    Finally, attention was directed to the medial cuneiform where a 4.5 cm dorsal medial linear incision was performed.  It was deepened to the level of the cuneiform.  I did use C-arm guidance, checked overall placement of the osteotomy.  The osteotomy was   performed.  I was able to move the medial column distal plantarward.  We did add a 7.5 mm proximal and distal to the joints.  He was found to be very good.  The final C-arm pictures were performed and placed in the chart.  Wounds were irrigated with   sterile saline.  It was then closed in layers with 2-0 Vicryl and 4-0 nylon.  A dry sterile compressive dressing consisting of Betadine-soaked Adaptic, 4 x 4s, and Krinkle was applied.  He was placed into a nonweightbearing posterior splint.  The patient   tolerated the procedures and anesthesia well.  No complications.  He was given written and oral postoperative instructions and will return to clinic in 10 to 12 days for a postop check.    Roge Buchanan DPM        D: 2021   T: 2021   MT: lorne    Name:     TAMIKA MARC  MRN:      [id number]        Account:        [id number]   :      1985           Procedure Date: 2021      Document: [id number]

## 2022-01-12 VITALS — BODY MASS INDEX: 24.24 KG/M2 | WEIGHT: 193.9 LBS

## 2022-01-18 VITALS — BODY MASS INDEX: 26.73 KG/M2 | HEIGHT: 75 IN | WEIGHT: 215 LBS

## 2022-02-11 VITALS — BODY MASS INDEX: 28.54 KG/M2 | WEIGHT: 222.4 LBS | HEIGHT: 74 IN

## 2022-02-16 NOTE — NURSING NOTE
Quick Intake Entered On:  9/16/2020 3:02 PM CDT    Performed On:  9/16/2020 2:51 PM CDT by Arpita Cancino               Summary   Weight Measured :   222.4 lb(Converted to: 222 lb 6 oz, 100.88 kg)    Arpita Cancino - 9/16/2020 2:51 PM CDT   Height Measured :   74 in(Converted to: 6 ft 2 in, 187.96 cm)      Body Mass Index :   28.55 kg/m2 (HI)      Body Surface Area :   2.29 m2   Arpita Cancino - 9/16/2020 4:03 PM CDT
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
## 2024-09-21 NOTE — ADDENDUM NOTE
Addendum Note by Pillo Reardon, RN at 9/1/2020 12:53 PM     Author: Pillo Reardon RN Service: -- Author Type: Registered Nurse    Filed: 9/4/2020 10:09 AM Encounter Date: 9/1/2020 Status: Signed    : Pillo Reardon RN (Registered Nurse)    Addended by: PILLO REARDON on: 9/4/2020 10:09 AM        Modules accepted: Orders        
no